# Patient Record
(demographics unavailable — no encounter records)

---

## 2025-05-27 NOTE — PHYSICAL EXAM
[de-identified] : Knee (right)   Inspection  Skin: normal  Effusion: Moderate-significant Bursa swelling: none   Palpation  Tenderness: Mild-moderate Location: MPFL, no pain over medial or lateral joint lines  Crepitus: none.  Defect: none.  Popliteal fullness: Present.   Flexion  Limited actively and passively due to pain to 45 degrees  Extension  Limited actively by 10 degrees and 5 degrees passively    Straight Leg Raise- can perform  Motor strength  Flexion: 4/5  Extension: 4/5   Sensory index  Normal.   ACL/PCL tests  Lachman test: Equivocal difficult to ascertain due to swelling Anterior drawer: No gross laxity Posterior drawer: stable   MCL/LCL tests  MCL laxity: stable  LCL laxity: 1+ laxity  Patellofemoral tests  Patellar grind test: negative  Patellar apprehension: Positive  Meniscal tests  Tanya's test: Positive Thessalys: Unable to perform [de-identified] : XR of right knee Date: 5/27/2025   Views: 4 views Performed at [facility]: Yes Impression: No fracture dislocation or malalignment.  Moderate suprapatellar effusion.  These images were personally reviewed with original findings documented as above.

## 2025-05-27 NOTE — HISTORY OF PRESENT ILLNESS
[de-identified] : Christiano Sanchez is a 39 year old male presenting with 3-day history of right-sided knee pain and swelling.  Patient states he was at an 8 years birthday party for [place] weekend and was playing pickup soccer and twisted his right knee and fell to the ground.  He felt his kneecap "popped out of place" and feels like he was shifted over to the right side then popped back in on its own.  Afterwards he tried to walk on it a few times and felt that it Popping out of place while he was walking to the car.  He went to urgent care and was given a hinged knee brace and crutches which has been somewhat helpful but he feels that his kneecap continues to dislocate in the brace.  Has not needed pain medication up until this point.  States he has had a subluxation episode of the left patella in the past but no kerri dislocation.  X-rays were done in urgent care which she was told were negative.  He is here today for further evaluation.  Patient is concerned because he has a concert coming up in 1 week and wants to know if he can attend as well.

## 2025-05-27 NOTE — DISCUSSION/SUMMARY
[de-identified] : Christiano Sanchez is a 39 year old male presenting with 3-day history of traumatic right knee pain after playing soccer in tug-of-war at a birthday party over Memorial Day weekend potentially resulting in a patellar dislocation versus also possible concurrent ACL and/or meniscus tear.  May also have a degree of LCL injury.  Exam limited due to significant swelling and pain.  Plan: 1.  Lateral J hinged knee brace prescribed and to be used at all times. 2.  Patient will continue crutches may use 1 crutch if needed 3.  Ace wrap given and recommended compression and elevation 4.  Right knee MRI ordered to evaluate for other internal derangement given limited exam 5.  Patient will follow-up in 1 month to discuss results.

## 2025-06-26 NOTE — HISTORY OF PRESENT ILLNESS
[de-identified] : Christiano Sanchez is a 39 year old male presenting with 3-day history of right-sided knee pain and swelling.  Patient states he was at an 8 years birthday party for [place] weekend and was playing pickup soccer and twisted his right knee and fell to the ground.  He felt his kneecap "popped out of place" and feels like he was shifted over to the right side then popped back in on its own.  Afterwards he tried to walk on it a few times and felt that it Popping out of place while he was walking to the car.  He went to urgent care and was given a hinged knee brace and crutches which has been somewhat helpful but he feels that his kneecap continues to dislocate in the brace.  Has not needed pain medication up until this point.  States he has had a subluxation episode of the left patella in the past but no kerri dislocation.  X-rays were done in urgent care which she was told were negative.  He is here today for further evaluation.  Patient is concerned because he has a concert coming up in 1 week and wants to know if he can attend as well.  Interval history: Patient is here for MRI results.

## 2025-06-26 NOTE — PHYSICAL EXAM
[de-identified] : Knee (right)   Inspection  Skin: normal  Effusion: Moderate Bursa swelling: none   Palpation  Tenderness: Mild-moderate Location: MPFL, no pain over medial or lateral joint lines  Crepitus: none.  Defect: none.  Popliteal fullness: Present.   Flexion  Limited actively and passively due to pain to 45 degrees  Extension  Limited actively by 10 degrees and 5 degrees passively    Straight Leg Raise- can perform  Motor strength  Flexion: 4/5  Extension: 4/5   Sensory index  Normal.   ACL/PCL tests  Lachman test: Equivocal difficult to ascertain due to swelling Anterior drawer: No gross laxity Posterior drawer: stable   MCL/LCL tests  MCL laxity: stable  LCL laxity: 1+ laxity  Patellofemoral tests  Patellar grind test: negative  Patellar apprehension: Positive  Meniscal tests  Tanya's test: Positive Thessalys: Unable to perform [de-identified] : MRI of right knee Date: 6/24/2025  Performed at [facility]: Yes Impression:   1.  Full-thickness ACL rupture. Associated marrow contusions of medial femoral condyle/posterolateral tibial plateau.  2.  Equivocal nondisplaced tear of medial meniscus posterior horn with meniscal contusion. Sprain of MCL deep fibers with subjacent edema at the tibial rim.  3.  Large knee joint effusion. Small popliteal cyst.  These images were personally reviewed with original findings documented as above.

## 2025-06-26 NOTE — DISCUSSION/SUMMARY
[de-identified] : Christiano Sanchez is a 39 year old male presenting with 3-day history of traumatic right knee pain after playing soccer in tug-of-war at a birthday party over [place] Day weekend potentially resulting in a patellar dislocation versus also possible concurrent ACL and/or meniscus tear.  May also have a degree of LCL injury.  Exam limited due to significant swelling and pain.  Discussed results of MRI which show complete rupture of ACL.  Also minor injury to the medial meniscus and MCL.  Plan: 1.  Patient will continue to use Ace wrap for compression and elevation 2.  Referral was given to sports surgeon for consultation for ACL reconstruction 3.  Encouraged patient to work on quad sets and try to achieve this close to full range of motion as possible with knee extension and flexion.